# Patient Record
(demographics unavailable — no encounter records)

---

## 2025-04-25 NOTE — IMAGING
[Left] : left knee [AP] : anteroposterior [Lateral] : lateral [[place]] : [place] [Moderate tricompartmental OA medial narrowing] : Moderate tricompartmental OA medial narrowing

## 2025-04-25 NOTE — ASSESSMENT
[FreeTextEntry1] : 64 yo male, hx meniscus surgery 10yr ago, presents today for eval of his left knee. XR with evidence of medial joint space narrowing. Exam suggestive of both meniscus pathology and MCL pathology, with opening on valgus stress. Discussed proceeding with MRI for further evaluation.   - Patient given prescription for MRI, follow up after study is completed to discuss results.   - Recommend hinged knee brace on the left side for lateral support   - Recommend NSAIDs PRN  - Recommend rest, ice, compression, elevation - Recommend activity modification, avoid strenuous or high impact activities  Patient was educated on their diagnosis today. Emphasized the importance of gentle stretching and strengthening. Patient expressed understanding and all questions were answered today.    F/U after MRI

## 2025-04-25 NOTE — HISTORY OF PRESENT ILLNESS
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

## 2025-05-23 NOTE — DATA REVIEWED
[MRI] : MRI [Left] : left [Knee] : knee [I independently reviewed and interpreted images and report] : I independently reviewed and interpreted images and report [FreeTextEntry1] : 5/2025 MRI of L knee was reviewed today and is as follows:  Complex medial meniscus tear Mucoid degeneration of ACL Severe medial and moderate lateral compartment OA

## 2025-05-23 NOTE — HISTORY OF PRESENT ILLNESS
[de-identified] : Body part: LT knee  Better/ Worse/ Same since last visit: better with Naproxen  Treatments since last visit: MRI  Difficulty with: going up and down stairs.  Radicular symptoms: none  Current medications for pain: Naproxen, good relief  Assistive walking device: none    Today's Pain:  3/10 (with naproxen)   Current work status (if Workers Comp): Retired

## 2025-05-23 NOTE — ASSESSMENT
[FreeTextEntry1] : 5/2025 MRI of L knee was reviewed today and is as follows:  Complex medial meniscus tear Mucoid degeneration of ACL Severe medial and moderate lateral compartment OA  62 yo male, hx meniscus surgery 10yr ago, presents today for eval of his left knee. MRI with evidence of new complex medial meniscus tear with accompanying OA. We discussed proceeding with CSI and PT for symptomatic relief.   - Recommend physical therapy to regain range of motion, strengthening and symptomatic improvement. Prescription given in office today.   - Patient given CSI into L knee today secondary to pain and inflammation. Patient tolerated the procedure well. Advised patient to ice the area well for the next 24 hours.   - Recommend NSAIDs PRN  - Recommend rest, ice, compression, elevation - Recommend activity modification, avoid strenuous or high impact activities  Patient was educated on their diagnosis today. Emphasized the importance of gentle stretching and strengthening. Patient expressed understanding and all questions were answered today.    Follow up in 3 months